# Patient Record
Sex: FEMALE | Race: WHITE | NOT HISPANIC OR LATINO | Employment: OTHER | URBAN - METROPOLITAN AREA
[De-identification: names, ages, dates, MRNs, and addresses within clinical notes are randomized per-mention and may not be internally consistent; named-entity substitution may affect disease eponyms.]

---

## 2024-07-30 ENCOUNTER — APPOINTMENT (OUTPATIENT)
Dept: SURGERY | Facility: CLINIC | Age: 69
End: 2024-07-30
Payer: MEDICARE

## 2024-07-30 VITALS
TEMPERATURE: 98.1 F | RESPIRATION RATE: 17 BRPM | HEART RATE: 80 BPM | DIASTOLIC BLOOD PRESSURE: 72 MMHG | SYSTOLIC BLOOD PRESSURE: 119 MMHG | WEIGHT: 216 LBS | OXYGEN SATURATION: 98 %

## 2024-07-30 DIAGNOSIS — M62.08 DIASTASIS RECTI: Primary | ICD-10-CM

## 2024-07-30 PROCEDURE — 1157F ADVNC CARE PLAN IN RCRD: CPT | Performed by: SURGERY

## 2024-07-30 PROCEDURE — 1036F TOBACCO NON-USER: CPT | Performed by: SURGERY

## 2024-07-30 PROCEDURE — 1159F MED LIST DOCD IN RCRD: CPT | Performed by: SURGERY

## 2024-07-30 PROCEDURE — 99204 OFFICE O/P NEW MOD 45 MIN: CPT | Performed by: SURGERY

## 2024-07-30 RX ORDER — LEVOTHYROXINE SODIUM 125 UG/1
1 TABLET ORAL
COMMUNITY
Start: 2024-02-07

## 2024-07-30 RX ORDER — ONDANSETRON 8 MG/1
TABLET, ORALLY DISINTEGRATING ORAL
COMMUNITY
Start: 2024-04-29

## 2024-07-30 NOTE — PROGRESS NOTES
History Of Present Illness :  Sheela Crabtree is a 68 y.o. female who presents essentially as a self-referral for an opinion regarding a diastases recti.  The patient has noted a progressive thinning or bulging of her upper abdominal wall for many years or decades.  She has chronic back pain, and does attribute this to a degree to a weakened abdominal wall.  She has never had a previous abdominal operation.  She denies any gastrointestinal symptoms.    Patient has a history of hypothyroidism secondary to Hashimoto's thyroiditis and follows with an endocrinologist at [facility].    The patient's primary physician is Dr. Nic Quinn.      The patient is  and lives in [place].  She is semiretired.  She works as a neuromuscular therapist.    Past Medical History   No past medical history on file.     Surgical History    No past surgical history on file.     Allergies   Not on File     Home Meds  No current outpatient medications     Family History    No family history on file.     Social History        Review Of Systems    Review of Systems    General: Not Present- Obesity, Cancer, HIV, MRSA, Recent Cold/Flu, Tired During the Day and VRE.  HEENT: Not Present- Migraine, Cataracts, Glaucoma, Macular Degeneration and Retinal Detachment.  Respiratory: Not Present- Asthma, Chronic Cough, Difficulty Breathing on Exertion, Difficulty Breathing at Rest, Emphysema, Frequent Bronchitis, Home CPAP/BiPAP, Home Oxygen, Pulmonary Embolus, Pneumonia/TB, Sleep Apnea and Snoring.  Cardiovascular: Not Present- Chest Pain, Congestive Heart Failure, Heart Attack, Coronary Artery Disease, Heart Stent, High Cholesterol/Lipids, Hypertension, Internal Defibrillator, Irregular Heart Beat, Mitral Valve Prolapse, Murmur, Pacemaker and Peripheral Vascular Disease.  Gastrointestinal: Not Present- Heartburn, Hepatitis, Hiatal Hernia, Jaundice, Reflux, Stomach Ulcer and IBS.  Female Genitourinary: Not Present- Kidney Failure, Kidney Stones,  Dialysis and Urinary Tract Infection.  Musculoskeletal: Not Present- Arthritis, Back Pain and Fibromyalgia.  Neurological: Not Present- Headaches, Numbness, Tingling, Seizures, Stroke,  Shunt and Weakness.  Psychiatric: Not Present- Anxiety, Bipolar, Depression and Panic Attacks.  Endocrine: Not Present- Diabetes, Hyperthyroidism and Low Blood Sugar.  Hematology: Not Present- Abnormal Bleeding, Anemia and Blood Clots.    Vitals  There were no vitals taken for this visit.     Physical Exam   Abdominal / Ano-Rectal Physical Exam    General  General Appearance - Not in acute distress.  Well-developed and well-nourished.  Alert and oriented times 3.    Chest and Lung Exam  Auscultation:  Breath sounds: - Normal.  Clear and equal bilaterally.  Adventitious sounds: - No Adventitious sounds.    Cardiovascular  Auscultation: Rate and Rhythm - Regular. Heart Sounds - Normal heart sounds.  No murmurs.    Abdomen  Inspection: Inspection of the abdomen reveals - No Visible peristalsis, No Abnormal pulsations, No Paradoxical  movements and No Hernias.  Palpation/Percussion: Palpation and Percussion of the abdomen reveal - Non Tender, No Rebound tenderness, NoRigidity (guarding) and No Palpable abdominal masses.  Liver: - Normal.  Spleen: - Normal.  Auscultation: Auscultation of the abdomen reveals - Bowel sounds normal and No Abdominal bruits.  Surgical scars:  none  The patient was examined supine and standing, with and without Valsalva  Her abdominal wall is intact; there are no fascial defects or bulges consistent with hernias    When moving from a supine to sitting position, the patient does have a large diastases recti along the upper midline measuring 10 cm transverse by 15 cm sagittal by about 5 cm in depth.      Inguinal  No inguinal or femoral hernias or lymphadenopathy bilaterally.    Rectal  Anorectal Exam:  not examined.      Assessment/Plan   Mrs. Crabtree has a large diastases recti.    Recommendations:    Repair  of RAD (rectus abdominis diastasis) typically falls in the realm of plastic surgery, as it is not a true hernia (i.e. fascial defect).  Typically, conservative management with weight loss and exercise is recommended as first-line treatment.      Just as with true hernias, there are multiple approaches to repair - open vs. laparoscopic, suture plication vs. mesh, anterior (on-lay) mesh vs. posterior mesh (retro-rectus) - there are proponents of all these methods.  The approach needs to be individualized to the particular patient, and of course is based on the surgeon´s comfort level.  Often, if there are multiple methods to approach a particular operation, that means no one method is superior.  In females, the repair is often combined with abdominoplasty.   Recurrence rates are variable, but can be quite high in some series - as high as 40%.  Repair of RAD may be considered cosmetic by insurance companies as it is not a true hernia.  There are no long-term pathologic consequences of not repairing RAD.      I explained to the patient that I do not operate for diastases recti, as my abdominal wall practice is limited to surgical care of hernias.    The patient does desire a second opinion from a surgeon who may have experience with repair of RAD.  We will try to arrange this for her.    Addendum 7/31/2024 [id number]    I reached out to one of my plastic surgery colleagues, Dr. Kam Muniz, who has experience with diastases recti, and we will be happy to see the patient in consultation.  My medical assistant, Ginger, will help arrange.  The patient will follow-up with me as needed.

## 2024-07-31 ENCOUNTER — TELEPHONE (OUTPATIENT)
Dept: SURGERY | Facility: CLINIC | Age: 69
End: 2024-07-31
Payer: MEDICARE

## 2024-09-09 ENCOUNTER — OFFICE VISIT (OUTPATIENT)
Dept: PLASTIC SURGERY | Facility: CLINIC | Age: 69
End: 2024-09-09
Payer: MEDICARE

## 2024-09-09 VITALS — BODY MASS INDEX: 35.99 KG/M2 | WEIGHT: 216 LBS | HEIGHT: 65 IN

## 2024-09-09 DIAGNOSIS — M62.08 DIASTASIS OF RECTUS ABDOMINIS: Primary | ICD-10-CM

## 2024-09-09 PROCEDURE — 3008F BODY MASS INDEX DOCD: CPT | Performed by: PHYSICIAN ASSISTANT

## 2024-09-09 PROCEDURE — 99213 OFFICE O/P EST LOW 20 MIN: CPT | Performed by: PHYSICIAN ASSISTANT

## 2024-09-09 PROCEDURE — 99203 OFFICE O/P NEW LOW 30 MIN: CPT | Performed by: PHYSICIAN ASSISTANT

## 2024-09-09 PROCEDURE — 1159F MED LIST DOCD IN RCRD: CPT | Performed by: PHYSICIAN ASSISTANT

## 2024-09-09 PROCEDURE — 1157F ADVNC CARE PLAN IN RCRD: CPT | Performed by: PHYSICIAN ASSISTANT

## 2024-09-09 NOTE — LETTER
2024     No Recipients    Patient: Sheela Crabtree   YOB: 1955   Date of Visit: 2024       Dear Dr. Lechuga Recipients:    Thank you for referring Sheela Crabtree to me for evaluation. Below are my notes for this consultation.  If you have questions, please do not hesitate to call me. I look forward to following your patient along with you.       Sincerely,     Ingris Ricks PA-C      CC: No Recipients  ______________________________________________________________________________________      Department of Plastic and Reconstructive Surgery      Initial Office Consultation    Date: 24  Referring Provider: Dr. Bennett  Primary Care Provider: No primary care provider on file.    Subjective  Sheela Crabtree is a 68 y.o. female who was referred by Dr. Bennett for evaluation of diastasis rectus.      She presents today to discuss abdominal bulging from a known diastasis. She endorses that with her first pregnancy in  she endorse she suffered severe diastasis rectus that caused bruising of her abdomen. She endorses she still has discoloration from the bruising. She endorses that she had 2 other pregnancies after this with .   She endorses in the  she was approved for repair of the diastasis rectus muscles however she did not pursue this due to loss of job/insurance at that time. She presents today due to worsening of abdominal bulging and pain. She endorses daily pain in the abdomen from weakness of the muscles. She states any time she flexes her abdomen to sit or stand the bulging in growing in size and causing her to experience abdominal and lower back pain. She has not had CT of the abdomen. She has attempted physical therapy years ago. She feels the bulge is growing in size.     PMH: ROBBY on CPAP, hypothyroidism secondary to Hashimoto's,   Surgical Hx:  x2, b/l breast implant removal   Family Hx: non-contributory   Smoking: non-smoker      Review of Systems   All  other systems have been reviewed with the patient and have been found to be negative with exception to the chief complaint as mentioned in the history of present illness.    ROS: As noted in history of present illness    - CONSTITUTIONAL: Denies weight loss, fever and chills.  - HEENT: Denies changes in vision and hearing.  - RESPIRATORY: Denies SOB and cough, difficulty breathing  - CV: Denies palpitations and CP  - GI: Denies nausea, vomiting and diarrhea. Positive for abdominal pain  - : Denies dysuria and urinary frequency.  - MSK: Denies myalgia and joint pain. Positive for low back pain  - SKIN: Denies rash and pruritus.  - NEUROLOGICAL: Denies headache and syncope.      Objective  Vital Signs: There were no vitals filed for this visit.    Gen: interactive and pleasant  Head: NCAT  Eyes: EOMI, PERRLA  Mouth: MMM  Throat: trachea midline  Cor: RRR  Pulm: nonlabored breathing  Abd: s/nt/nd  Neuro: AAOx3  Ext: extremities perfused    Focused exam of the: abdomen    Abdomen is soft, non-tender. There is excess skin of the lower abdomen. There is visible bulge of the abdomen. Bulge measures approx 20cm. When sitting and flexing the trunk bulge increases. Bulging is noted between the umbilicus and xiphoid.      Imaging  No imaging noted to review        Assessment/Plan    Sheela Crabtree is a 68 y.o. female who was referred by Dr. Bennett for evaluation of diastasis rectus.    She presents today with complaints of abdominal bulging from diastasis rectus. She endorses increasing lower back and abdominal pain that is worsening. She is experiencing pain daily. She attempted physical therapy years ago. I advised we will refer her to physical therapy for worsening abdominal weakness and lower back pain. Additionally I advised we will perform CT scan to further evaluate the extend of her diastasis.  She has not had imaging of the abdomen at this point. We will have her follow up after completion of CT scan and physical  therapy.       Plan:   Follow up with Dr Muniz after completion of physical therapy and CT abdomen  Referral to physical therapy   CT abdomen    I spent 35 minutes with this patient. Greater than 50% of this time was spent in the counselling and/or coordination of care of this patient.  This note was created using voice recognition software and was not corrected for typographical or grammatical errors.    Signature: Ingris Ricks PA-C  Date: 09/09/24

## 2024-09-09 NOTE — PROGRESS NOTES
Department of Plastic and Reconstructive Surgery      Initial Office Consultation    Date: 24  Referring Provider: Dr. Bennett  Primary Care Provider: No primary care provider on file.    Syed Crabtree is a 68 y.o. female who was referred by Dr. Bennett for evaluation of diastasis rectus.      She presents today to discuss abdominal bulging from a known diastasis. She endorses that with her first pregnancy in  she endorse she suffered severe diastasis rectus that caused bruising of her abdomen. She endorses she still has discoloration from the bruising. She endorses that she had 2 other pregnancies after this with .   She endorses in the  she was approved for repair of the diastasis rectus muscles however she did not pursue this due to loss of job/insurance at that time. She presents today due to worsening of abdominal bulging and pain. She endorses daily pain in the abdomen from weakness of the muscles. She states any time she flexes her abdomen to sit or stand the bulging in growing in size and causing her to experience abdominal and lower back pain. She has not had CT of the abdomen. She has attempted physical therapy years ago. She feels the bulge is growing in size.     PMH: ROBBY on CPAP, hypothyroidism secondary to Hashimoto's,   Surgical Hx:  x2, b/l breast implant removal   Family Hx: non-contributory   Smoking: non-smoker      Review of Systems   All other systems have been reviewed with the patient and have been found to be negative with exception to the chief complaint as mentioned in the history of present illness.    ROS: As noted in history of present illness    - CONSTITUTIONAL: Denies weight loss, fever and chills.  - HEENT: Denies changes in vision and hearing.  - RESPIRATORY: Denies SOB and cough, difficulty breathing  - CV: Denies palpitations and CP  - GI: Denies nausea, vomiting and diarrhea. Positive for abdominal pain  - : Denies dysuria and  urinary frequency.  - MSK: Denies myalgia and joint pain. Positive for low back pain  - SKIN: Denies rash and pruritus.  - NEUROLOGICAL: Denies headache and syncope.      Objective   Vital Signs: There were no vitals filed for this visit.    Gen: interactive and pleasant  Head: NCAT  Eyes: EOMI, PERRLA  Mouth: MMM  Throat: trachea midline  Cor: RRR  Pulm: nonlabored breathing  Abd: s/nt/nd  Neuro: AAOx3  Ext: extremities perfused    Focused exam of the: abdomen    Abdomen is soft, non-tender. There is excess skin of the lower abdomen. There is visible bulge of the abdomen. Bulge measures approx 20cm. When sitting and flexing the trunk bulge increases. Bulging is noted between the umbilicus and xiphoid.      Imaging  No imaging noted to review        Assessment/Plan     Sheela Crabtree is a 68 y.o. female who was referred by Dr. Bennett for evaluation of diastasis rectus.    She presents today with complaints of abdominal bulging from diastasis rectus. She endorses increasing lower back and abdominal pain that is worsening. She is experiencing pain daily. She attempted physical therapy years ago. I advised we will refer her to physical therapy for worsening abdominal weakness and lower back pain. Additionally I advised we will perform CT scan to further evaluate the extend of her diastasis.  She has not had imaging of the abdomen at this point. We will have her follow up after completion of CT scan and physical therapy.       Plan:   Follow up with Dr Muniz after completion of physical therapy and CT abdomen  Referral to physical therapy   CT abdomen    I spent 35 minutes with this patient. Greater than 50% of this time was spent in the counselling and/or coordination of care of this patient.  This note was created using voice recognition software and was not corrected for typographical or grammatical errors.    Signature: Ingris Ricks PA-C  Date: 09/09/24

## 2024-09-09 NOTE — LETTER
2024     Riley Bennett MD  [address]  [place] [address]    Patient: Sheela Crabtree   YOB: 1955   Date of Visit: 2024       Dear Dr. Riley Bennett MD:    Thank you for referring Sheela Crabtree to me for evaluation. Below are my notes for this consultation.  If you have questions, please do not hesitate to call me. I look forward to following your patient along with you.       Sincerely,     Ingris Ricks PA-C      CC: No Recipients  ______________________________________________________________________________________      Department of Plastic and Reconstructive Surgery      Initial Office Consultation    Date: 24  Referring Provider: Dr. Bennett  Primary Care Provider: No primary care provider on file.    Subjective  Sheela Crabtree is a 68 y.o. female who was referred by Dr. Bennett for evaluation of diastasis rectus.      She presents today to discuss abdominal bulging from a known diastasis. She endorses that with her first pregnancy in  she endorse she suffered severe diastasis rectus that caused bruising of her abdomen. She endorses she still has discoloration from the bruising. She endorses that she had 2 other pregnancies after this with .   She endorses in the  she was approved for repair of the diastasis rectus muscles however she did not pursue this due to loss of job/insurance at that time. She presents today due to worsening of abdominal bulging and pain. She endorses daily pain in the abdomen from weakness of the muscles. She states any time she flexes her abdomen to sit or stand the bulging in growing in size and causing her to experience abdominal and lower back pain. She has not had CT of the abdomen. She has attempted physical therapy years ago. She feels the bulge is growing in size.     PMH: ROBBY on CPAP, hypothyroidism secondary to Hashimoto's,   Surgical Hx:  x2, b/l breast implant removal   Family Hx: non-contributory    Smoking: non-smoker      Review of Systems   All other systems have been reviewed with the patient and have been found to be negative with exception to the chief complaint as mentioned in the history of present illness.    ROS: As noted in history of present illness    - CONSTITUTIONAL: Denies weight loss, fever and chills.  - HEENT: Denies changes in vision and hearing.  - RESPIRATORY: Denies SOB and cough, difficulty breathing  - CV: Denies palpitations and CP  - GI: Denies nausea, vomiting and diarrhea. Positive for abdominal pain  - : Denies dysuria and urinary frequency.  - MSK: Denies myalgia and joint pain. Positive for low back pain  - SKIN: Denies rash and pruritus.  - NEUROLOGICAL: Denies headache and syncope.      Objective  Vital Signs: There were no vitals filed for this visit.    Gen: interactive and pleasant  Head: NCAT  Eyes: EOMI, PERRLA  Mouth: MMM  Throat: trachea midline  Cor: RRR  Pulm: nonlabored breathing  Abd: s/nt/nd  Neuro: AAOx3  Ext: extremities perfused    Focused exam of the: abdomen    Abdomen is soft, non-tender. There is excess skin of the lower abdomen. There is visible bulge of the abdomen. Bulge measures approx 20cm. When sitting and flexing the trunk bulge increases. Bulging is noted between the umbilicus and xiphoid.      Imaging  No imaging noted to review        Assessment/Plan    Sheela Crabtree is a 68 y.o. female who was referred by Dr. Bennett for evaluation of diastasis rectus.    She presents today with complaints of abdominal bulging from diastasis rectus. She endorses increasing lower back and abdominal pain that is worsening. She is experiencing pain daily. She attempted physical therapy years ago. I advised we will refer her to physical therapy for worsening abdominal weakness and lower back pain. Additionally I advised we will perform CT scan to further evaluate the extend of her diastasis.  She has not had imaging of the abdomen at this point. We will have her follow  up after completion of CT scan and physical therapy.       Plan:   Follow up with Dr Muniz after completion of physical therapy and CT abdomen  Referral to physical therapy   CT abdomen    I spent 35 minutes with this patient. Greater than 50% of this time was spent in the counselling and/or coordination of care of this patient.  This note was created using voice recognition software and was not corrected for typographical or grammatical errors.    Signature: Ingris Ricks PA-C  Date: 09/09/24

## 2024-09-26 ENCOUNTER — HOSPITAL ENCOUNTER (OUTPATIENT)
Dept: RADIOLOGY | Facility: CLINIC | Age: 69
Discharge: HOME | End: 2024-09-26
Payer: MEDICARE

## 2024-09-26 DIAGNOSIS — M62.08 DIASTASIS OF RECTUS ABDOMINIS: ICD-10-CM

## 2024-09-26 PROCEDURE — 74176 CT ABD & PELVIS W/O CONTRAST: CPT

## 2024-09-26 PROCEDURE — 2550000001 HC RX 255 CONTRASTS: Performed by: PHYSICIAN ASSISTANT

## 2024-09-26 RX ORDER — DIATRIZOATE MEGLUMINE AND DIATRIZOATE SODIUM 660; 100 MG/ML; MG/ML
30 SOLUTION ORAL; RECTAL ONCE
Status: COMPLETED | OUTPATIENT
Start: 2024-09-26 | End: 2024-09-26

## 2024-10-08 ENCOUNTER — EVALUATION (OUTPATIENT)
Dept: PHYSICAL THERAPY | Facility: CLINIC | Age: 69
End: 2024-10-08
Payer: MEDICARE

## 2024-10-08 DIAGNOSIS — M62.08 DIASTASIS OF RECTUS ABDOMINIS: Primary | ICD-10-CM

## 2024-10-08 PROCEDURE — 97110 THERAPEUTIC EXERCISES: CPT | Mod: GP

## 2024-10-08 PROCEDURE — 97161 PT EVAL LOW COMPLEX 20 MIN: CPT | Mod: GP

## 2024-10-08 ASSESSMENT — ENCOUNTER SYMPTOMS
OCCASIONAL FEELINGS OF UNSTEADINESS: 1
DEPRESSION: 0
LOSS OF SENSATION IN FEET: 0

## 2024-10-08 NOTE — PROGRESS NOTES
Physical Therapy    PELVIC FLOOR EVALUATION AND TREATMENT    Patient Name: Sheela Crabtree  MRN: [id number]  Today's Date: 10/8/2024  Time Calculation  Start Time: 1437  Stop Time: 1530  Time Calculation (min): 53 min  PT Evaluation Time Entry  PT Evaluation (Low) Time Entry: 38  PT Therapeutic Procedures Time Entry  Therapeutic Exercise Time Entry: 15        Insurance: Medicare 80/20 after ded no auth req. Follows Medicare. [place]  Primary diagnosis: Diastasis of rectus abdominis  Visit # 1    Assessment:    Sheela Crabtree is a 68 y.o. referred for diastasis of rectus abdominis who presents with mid to lower abdominal soreness/pain and abdominal bulge with daily functional mobility. Observed valsalva maneuver with transitional movements and therapeutic exercises upon examination and treatment. Instructed in pressure management and initiated breathing sequencing to assist with management of symptoms. Patient would benefit from PT interventions to address the stated impairments, improve pain/pressure management/ease with functional mobility, establish HEP, and assist with chronic symptoms.    Plan   Treatment/Interventions: Education/ Instruction, Neuromuscular re-education, Therapeutic activities, Therapeutic exercises  PT Plan: Skilled PT  PT Frequency: 1 time per week  Duration: 4-6 visits  Certification Period Start Date: 10/08/2024  Certification Period End Date: 01/06/2024  Rehab Potential: Fair, chronic symptoms  Plan of Care Agreement: Patient    Current Problem  1. Diastasis of rectus abdominis  Referral to Physical Therapy    Follow Up In Physical Therapy        Problem List Items Addressed This Visit    None  Visit Diagnoses         Codes    Diastasis of rectus abdominis    -  Primary M62.08    Relevant Orders    Follow Up In Physical Therapy            General:  General  Reason for Referral: Diagnosis  M62.08 (ICD-10-CM) - Diastasis of rectus abdominis  Referred By: Ingris Alvarez,  PA-C  Precautions:  Precautions  STEADI Fall Risk Score (The score of 4 or more indicates an increased risk of falling): 3    Objective   PELVIC HISTORY:  Chief Complaint/Description of Symptoms: 9 inch diastasis of rectus abdominis, inguinal hernia on L wall. The internal wall bulges out, inguinal hernia gets achy. Worsened this past year.   HPI: 1 vaginal birth with tearing  first birth, rectus abdominis ripped apart, retrovaginal tear, rectal tear through sphincter muscle (residual numbness since); 2 c-sections ,   No help with PT in the past for diastasis, pelvic floor PT in 2019 with trigger points/tightness noted - uses pelvic wand (small change)  Moving too quickly get a pain in the  scar region.  Worsening overall. Surgery in the past cancelled due to Covid.  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 Discussed concerns on intake forms. Patient has followed up with medical providers to address.   Referred for PT: 2024    PELVIC PAIN:     Description: sore  Location: mid to lower abdomen  Frequency: intermittent  Pain at worst: 5/10 Exacerbating Factors: sitting up, worse with usage  Pain at best: 0/10  Alleviating Factors: not sitting forward, not using it    Since onset, the symptoms are: worsening  Pain when emptying bladder: no  Pain with wiping or tight clothing: no  Pain with intercourse: not currently sexually active  History of back pain: back pain for years, more constant now    EXERCISE:  Do you do Kegels? no   Current exercise regime: walking 3x/week, 20 minutes    BLADDER:     Excessive Urinary Urgency: no  Daytime Voiding Frequency: 6-7 voids/day  Nighttime Voiding Frequency: 1-2 voids/night  Unintentional urine loss  frequency: occasionally  Leakage occurs with: sneeze, just happens post-menopausal    Leakage amount: dribble; pads daily  Difficulty initiating urine flow/push to urinate: no  Slow/weak urine stream: a little  Able to completely empty bladder: yes  Frequent UTI's: no  Fluid Intake/Caffeine: 30-80oz/day, 0 caffeine/day    BOWEL:     Excessive Bowel Urgency: not frequently   BM Frequency: 6 times/day  Frequent Diarrhea: no  Frequent constipation/straining/incomplete emptying: rarely  Taking stool softeners or fiber supplements: no  Ancram Stool Scale rating: 3-4   Unintentional stool loss: decreased rectal control; sometimes will have stool loss   Stool loss frequency: 1-2 times/month; dons pads    External Examination:    POSTURE: mild forward head, rounded shoulders  Diastasis Recti: palpable 4 finger breaths at umbilicus at rest; increased abdominal bulge with head lift       MMT R/L:  Hip flexion: 4-/5 B P! L hernia region  Hip abduction: 4/5 B  Hip adduction: 4+/5 B  TA: poor    ROM R/L:  Lumbar flex: fingertips to malleoli  Lumbar ext: Mod limitation, increased abdominal bulge   Lumbar side bend: fingertips to mid femur B, sore L trunk  Hip flex: 90/90    Bridge endurance: 5 seconds; increased valsalva maneuver and increased bulge    OUTCOMES MEASURE:  Female NIH - Chronic Prostatitis Symptom Index (NIH-CPSI): Pain: 3; Urinary Symptoms: 2; QOL Impact: 4; total: 9    Treatment:  Therapeutic Exercise: Instructed in pressure management, as patient with tendency to hold breath, as well as breathing sequencing with log roll and sit to stand transfers, functional transfers with pelvic mobility. Education on modification or discontinuation of any exercise if adverse reaction arises.  Onset of vertigo with report of room spinning during supine to sit transfer with observed eye movement correlated, resolved with change in position on side. Plan to elevate bed surface in future and also recommend vestibular PT/follow up  with MD for symptoms (history of vertigo in the past)    Access Code: [email or web address]  URL: [email or web address]  Date: 10/08/2024  Prepared by: Suzette    Exercises  - Supine Posterior Pelvic Tilt  - 2 sets - 10 reps  - Seated Pelvic Tilt  - 2 sets - 10 reps    Patient Education  - Log Roll    OP Education: verbal, demonstration, HEP handout      Careplan Goals:    Problem: PT Problem       Goal: Patient will complete functional transitional movements, therapeutic exercises with Good breathing sequencing          Goal: Patient will sit up, complete functional transfers without pain on average, pain no greater than 3/10 at worst          Goal: [place] with self care for pressure management to prevent the reoccurrence of symptoms for home maintenance           Goal: Patient will demonstrate independence and compliance with HEP and self management           Goal: Patient will report a 6 point improvement on NIH-CPSI to demonstrate a reduction in symptoms and increase quality of life           Goal: Patient will improve strength by >=1/3 MMT to increase ease with daily functional mobility

## 2024-10-09 ENCOUNTER — APPOINTMENT (OUTPATIENT)
Dept: PHYSICAL THERAPY | Facility: CLINIC | Age: 69
End: 2024-10-09
Payer: MEDICARE

## 2024-11-05 ENCOUNTER — TREATMENT (OUTPATIENT)
Dept: PHYSICAL THERAPY | Facility: CLINIC | Age: 69
End: 2024-11-05
Payer: MEDICARE

## 2024-11-05 DIAGNOSIS — M62.08 DIASTASIS OF RECTUS ABDOMINIS: ICD-10-CM

## 2024-11-05 PROCEDURE — 97110 THERAPEUTIC EXERCISES: CPT | Mod: GP

## 2024-11-05 NOTE — PROGRESS NOTES
Physical Therapy    Physical Therapy Treatment    Patient Name: Sheela Crabtree  MRN: [id number]  Today's Date: 11/5/2024  Time Calculation  Start Time: 1600  Stop Time: 1642  Time Calculation (min): 42 min     PT Therapeutic Procedures Time Entry  Therapeutic Exercise Time Entry: 42        Insurance: Medicare 80/20 after ded no auth req. Follows Medicare. [place]  Primary diagnosis: Diastasis of rectus abdominis  Visit # 2      Assessment:  Patient returns for first follow up visit. Focused on intentionally breathing through transitional movements and therapeutic exercises to avoid breath holding/valsalva in order to assist with pressure management. Plan to ensure carry over next visit. Discontinued posterior pelvic tilt due to increased low back pain reported between sessions. Progressed with core engagement/TA without adverse reaction. Plan to continue as patient tolerates.    Plan:  Continue per initial PT POC.  Breathing sequencing with functional transfer sit to stand next    Current Problem  1. Diastasis of rectus abdominis  Follow Up In Physical Therapy        Problem List Items Addressed This Visit    None  Visit Diagnoses         Codes    Diastasis of rectus abdominis     M62.08            General   **referred for diastasis of rectus abdominis who presents with mid to lower abdominal soreness/pain and abdominal bulge with daily functional mobility    Precautions:   **Elevate head of bed due to history of vertigo**    Subjective    Recovered from vertigo flare up.   Noticed less swelling the abdomen after doing liver cleanse.  Always wore a pad, but stopped wearing it and noticed leakage - worse with sneeze, cough, laugh.  Back pain worse with posterior pelvic tilt - discontinued   No issues with constipation    Pain  Nagging pain 5/10 mid to lower abdomen   Worse with twisting too fast    Objective   FLEXIBILITY:  Adductor: Mod limitation    Treatment:  **Elevate head of bed due to history of  vertigo**  Therapeutic Exercise: Reviewed pressure management, as patient with tendency to hold breath, as well as breathing sequencing with log roll and sit to stand transfers, functional transfers with pelvic mobility. Reviewed modification or discontinuation of any exercise if adverse reaction arises.    Log roll with cues for breathing   Seated diaphragmatic breathing   Supine diaphragmatic breathing  Hookying on elevated mat, TA with cues for breathing sequencing  Hooklying on elevated mat, TA with hip adduction  Hooklying on elevated mat, TA with hip clamshell red TB  Supine adductor stretch  Back pain worse with posterior pelvic tilt - discontinued    Instructed in knack technique with cough/sneeze/laugh     OP Education: verbal, demonstration, HEP handout + knack handout    Access Code: [email or web address]  URL: [email or web address]  Date: 11/05/2024  Prepared by: Suzette    Exercises  - Supine Diaphragmatic Breathing  - 2-3 sets - 10 reps  - Supine Transversus Abdominis Bracing - Hands on Ground  - 2 sets - 10 reps  - Seated Diaphragmatic Breathing  - 2-3 sets - 10 reps  - Supine Hip Adduction Isometric with Ball  - 2 sets - 10 reps  - Hooklying Clamshell with Resistance  - 2 sets - 10 reps    Access Code: [email or web address]  URL: [email or web address]  Date: 10/08/2024  Prepared by: Suzette     Exercises  - Supine Posterior Pelvic Tilt  - 2 sets - 10 reps  - Seated Pelvic Tilt  - 2 sets - 10 reps     Patient Education  - Log Roll       Goals:  Active       PT Problem       Patient will complete functional transitional movements, therapeutic exercises with Good breathing sequencing       Start:  10/08/24    Expected End:  01/06/25            Patient will sit up, complete functional transfers without pain on average, pain no greater than 3/10 at worst       Start:  10/08/24    Expected End:  01/06/25            [place] with self care for pressure management to prevent the  reoccurrence of symptoms for home maintenance        Start:  10/08/24    Expected End:  01/06/25            Patient will demonstrate independence and compliance with HEP and self management        Start:  10/08/24    Expected End:  01/06/25            Patient will report a 6 point improvement on NIH-CPSI to demonstrate a reduction in symptoms and increase quality of life        Start:  10/08/24    Expected End:  01/06/25            Patient will improve strength by >=1/3 MMT to increase ease with daily functional mobility       Start:  10/08/24    Expected End:  01/06/25

## 2024-11-12 ENCOUNTER — TREATMENT (OUTPATIENT)
Dept: PHYSICAL THERAPY | Facility: CLINIC | Age: 69
End: 2024-11-12
Payer: MEDICARE

## 2024-11-12 DIAGNOSIS — M62.08 DIASTASIS OF RECTUS ABDOMINIS: Primary | ICD-10-CM

## 2024-11-12 PROCEDURE — 97110 THERAPEUTIC EXERCISES: CPT | Mod: GP

## 2024-11-12 NOTE — PROGRESS NOTES
Physical Therapy    Physical Therapy Treatment    Patient Name: Sheela Crabtree  MRN: [id number]  Today's Date: 11/12/2024  Time Calculation  Start Time: 1602  Stop Time: 1642  Time Calculation (min): 40 min     PT Therapeutic Procedures Time Entry  Therapeutic Exercise Time Entry: 42        Insurance: Medicare 80/20 after ded no auth req. Follows Medicare. [place]  Primary diagnosis: Diastasis of rectus abdominis  Visit # 3      Assessment:  Reviewed HEP to ensure proper set-up and technique. Verbal cues and tactile cues to improve core engagement with less intense drawing in upon review of HEP and ensuring breathing to avoid breath holding/valsalva. Continued focus on intentionally breathing through transitional movements with bed mobility and sit<>stand transfers, as well as throughout therapeutic exercises to to assist with pressure management. Plan to continue as patient tolerates. She is aware to discontinue any exercise if adverse reaction arises.     Plan:  Continue per initial PT POC.  Ensure Good carry over of breathing sequencing with functional transfer sit to stand     Current Problem  1. Diastasis of rectus abdominis            Problem List Items Addressed This Visit    None  Visit Diagnoses         Codes    Diastasis of rectus abdominis    -  Primary M62.08            General   **referred for diastasis of rectus abdominis who presents with mid to lower abdominal soreness/pain and abdominal bulge with daily functional mobility    Precautions:   **Elevate head of bed due to history of vertigo**    Subjective    Lower inguinal hernia is now becoming a constant pain.   Felt a big clunk at the sacrum rolling over in bed. One position was easier waking up the next morning, something I always felt was tight was gone.  Using knack technique    Pain  8/10 lower L abdomen, inguinal hernia region    Objective   FLEXIBILITY:  Adductor: Mod limitation    Treatment:  **Elevate head of bed due to history of  vertigo**  Therapeutic Exercise: Reviewed importance of pressure management, as patient with tendency to hold breath, as well as breathing sequencing with log roll and sit to stand transfers, functional transfers with pelvic mobility. Reviewed modification or discontinuation of any exercise if adverse reaction arises.    Log roll with cues for breathing   Seated diaphragmatic breathing   Supine diaphragmatic breathing  Hookying on elevated mat, TA with cues for breathing sequencing  Hooklying on elevated mat, TA with hip adduction  Hooklying on elevated mat, TA with hip clamshell red TB  Supine adductor stretch  Clamshell in sidelying  Functional sit to stand with breathing sequencing to assist with pressure management    Back pain worse with posterior pelvic tilt - discontinued 11/05/2024    Reviewed knack technique with cough/sneeze/laugh     OP Education: verbal, demonstration, HEP handout    Access Code: [email or web address]  URL: [email or web address]  Date: 11/12/2024  Prepared by: Suzette    Exercises  - Clamshell  - 2 sets - 10 reps  - Sit to Stand with Core Engagement  - 2 sets - 5 reps    Access Code: [email or web address]  URL: [email or web address]  Date: 11/05/2024  Prepared by: Suzette    Exercises  - Supine Diaphragmatic Breathing  - 2-3 sets - 10 reps  - Supine Transversus Abdominis Bracing - Hands on Ground  - 2 sets - 10 reps  - Seated Diaphragmatic Breathing  - 2-3 sets - 10 reps  - Supine Hip Adduction Isometric with Ball  - 2 sets - 10 reps  - Hooklying Clamshell with Resistance  - 2 sets - 10 reps    Access Code: [email or web address]  URL: [email or web address]  Date: 10/08/2024  Prepared by: Suzette     Exercises  - Supine Posterior Pelvic Tilt  - 2 sets - 10 reps  - Seated Pelvic Tilt  - 2 sets - 10 reps     Patient Education  - Log Roll       Goals:  Active       PT Problem       Patient will complete functional transitional movements, therapeutic exercises  with Good breathing sequencing       Start:  10/08/24    Expected End:  01/06/25            Patient will sit up, complete functional transfers without pain on average, pain no greater than 3/10 at worst       Start:  10/08/24    Expected End:  01/06/25            [place] with self care for pressure management to prevent the reoccurrence of symptoms for home maintenance        Start:  10/08/24    Expected End:  01/06/25            Patient will demonstrate independence and compliance with HEP and self management        Start:  10/08/24    Expected End:  01/06/25            Patient will report a 6 point improvement on NIH-CPSI to demonstrate a reduction in symptoms and increase quality of life        Start:  10/08/24    Expected End:  01/06/25            Patient will improve strength by >=1/3 MMT to increase ease with daily functional mobility       Start:  10/08/24    Expected End:  01/06/25

## 2024-11-19 ENCOUNTER — TREATMENT (OUTPATIENT)
Dept: PHYSICAL THERAPY | Facility: CLINIC | Age: 69
End: 2024-11-19
Payer: MEDICARE

## 2024-11-19 DIAGNOSIS — M62.08 DIASTASIS OF RECTUS ABDOMINIS: ICD-10-CM

## 2024-11-19 PROCEDURE — 97110 THERAPEUTIC EXERCISES: CPT | Mod: GP

## 2024-11-19 NOTE — PROGRESS NOTES
Physical Therapy    Physical Therapy Treatment    Patient Name: Sheela Crabtree  MRN: [id number]  Today's Date: 11/19/2024  Time Calculation  Start Time: 1605  Stop Time: 1645  Time Calculation (min): 40 min     PT Therapeutic Procedures Time Entry  Therapeutic Exercise Time Entry: 40        Insurance: Medicare 80/20 after ded no auth req. Follows Medicare. [place]  Primary diagnosis: Diastasis of rectus abdominis  Visit # 4      Assessment:  Improved breathing sequencing with log roll technique observed today, as well as therapeutic exercises. Slight bulging noted with head lift that improved with cues; although discontinued after two repetitions due to patient concerns for onset of vertigo flare up with head lift positioning. Progressed with repetitions without adverse reaction.    Plan:  Continue per initial PT POC.  Ensure continued carry over of breathing sequencing with functional transitional movements    Current Problem  1. Diastasis of rectus abdominis  Follow Up In Physical Therapy            Problem List Items Addressed This Visit    None  Visit Diagnoses         Codes    Diastasis of rectus abdominis     M62.08              General   **referred for diastasis of rectus abdominis who presents with mid to lower abdominal soreness/pain and abdominal bulge with daily functional mobility    Precautions:   **Elevate head of bed due to history of vertigo**    Subjective    I don't feel like the hernia's changed, but my core is feeling more stable and my back is feeling stronger.  I stopped pulling it in so far, so that stabbing pain stopped.  If I twist and turn wrong it will shoot to 10/10.  Using knack technique which has been helpful (cough/sneeze/laugh)    Pain  2-3/10 lower L abdomen, inguinal hernia region    Objective     Treatment:  **Elevate head of bed due to history of vertigo**  Therapeutic Exercise: Reviewed importance of pressure management, as patient with tendency to hold breath, as well  as breathing sequencing with log roll and sit to stand transfers, functional transfers with pelvic mobility. Reviewed modification or discontinuation of any exercise if adverse reaction arises.    Seated diaphragmatic breathing   Supine diaphragmatic breathing  Hookying on elevated mat, TA with cues for breathing sequencing  Hooklying on elevated mat, TA with hip adduction  Hooklying on elevated mat, TA with hip clamshell green TB  Supine adductor stretch  Clamshell in sidelying with added resistance band - reviewed  Functional sit to stand with breathing sequencing to assist with pressure management, red TB around knees    Back pain worse with posterior pelvic tilt - discontinued 11/05/2024       OP Education: verbal, demonstration, HEP handout  Abdominal support with work duties     Access Code: [email or web address]  URL: [email or web address]  Date: 11/12/2024  Prepared by: Suzette    Exercises  - Clamshell  - 2 sets - 10 reps  - Sit to Stand with Core Engagement  - 2 sets - 5 reps    Access Code: [email or web address]  URL: [email or web address]  Date: 11/05/2024  Prepared by: Suzette    Exercises  - Supine Diaphragmatic Breathing  - 2-3 sets - 10 reps  - Supine Transversus Abdominis Bracing - Hands on Ground  - 2 sets - 10 reps  - Seated Diaphragmatic Breathing  - 2-3 sets - 10 reps  - Supine Hip Adduction Isometric with Ball  - 2 sets - 10 reps  - Hooklying Clamshell with Resistance  - 2 sets - 10 reps    Access Code: [email or web address]  URL: [email or web address]  Date: 10/08/2024  Prepared by: Suzette     Exercises  - Supine Posterior Pelvic Tilt  - 2 sets - 10 reps  - Seated Pelvic Tilt  - 2 sets - 10 reps     Patient Education  - Log Roll       Goals:  Active       PT Problem       Patient will complete functional transitional movements, therapeutic exercises with Good breathing sequencing       Start:  10/08/24    Expected End:  01/06/25            Patient will sit up,  complete functional transfers without pain on average, pain no greater than 3/10 at worst       Start:  10/08/24    Expected End:  01/06/25            [place] with self care for pressure management to prevent the reoccurrence of symptoms for home maintenance        Start:  10/08/24    Expected End:  01/06/25            Patient will demonstrate independence and compliance with HEP and self management        Start:  10/08/24    Expected End:  01/06/25            Patient will report a 6 point improvement on NIH-CPSI to demonstrate a reduction in symptoms and increase quality of life        Start:  10/08/24    Expected End:  01/06/25            Patient will improve strength by >=1/3 MMT to increase ease with daily functional mobility       Start:  10/08/24    Expected End:  01/06/25

## 2024-11-26 ENCOUNTER — TREATMENT (OUTPATIENT)
Dept: PHYSICAL THERAPY | Facility: CLINIC | Age: 69
End: 2024-11-26
Payer: MEDICARE

## 2024-11-26 DIAGNOSIS — M62.08 DIASTASIS OF RECTUS ABDOMINIS: Primary | ICD-10-CM

## 2024-11-26 PROCEDURE — 97110 THERAPEUTIC EXERCISES: CPT | Mod: GP

## 2024-11-26 NOTE — PROGRESS NOTES
Physical Therapy    Physical Therapy Treatment    Patient Name: Sheela Crabtree  MRN: [id number]  Today's Date: 11/26/2024  Time Calculation  Start Time: 1605  Stop Time: 1645  Time Calculation (min): 40 min     PT Therapeutic Procedures Time Entry  Therapeutic Exercise Time Entry: 40        Insurance: Medicare 80/20 after ded no auth req. Follows Medicare. [place]its based on [place]  Primary diagnosis: Diastasis of rectus abdominis  Visit # 5      Assessment:  Progressed with functional transitional movements, hooklying to sitting today. Added in bend/lift body mechanics with waste basket to simulate daily functional mobility, with focus on breathing sequencing and pressure management. Good return demonstration. No change in pain.    Plan:  Continue per initial PT POC.  Ensure continued carry over of breathing sequencing with functional transitional movements, progression of therapeutic exercises    Current Problem  1. Diastasis of rectus abdominis  Follow Up In Physical Therapy            Problem List Items Addressed This Visit             ICD-10-CM    Diastasis of rectus abdominis - Primary M62.08         General   **referred for diastasis of rectus abdominis who presents with mid to lower abdominal soreness/pain and abdominal bulge with daily functional mobility    Precautions:   **Elevate head of bed due to history of vertigo**    Subjective    Getting stronger, feel more pulled in a little less sore. Noticing back less sore.    Pain  2-3/10 lower L abdomen, inguinal hernia region ice pick sensation    Objective     Treatment:  **Elevate head of bed due to history of vertigo**  Therapeutic Exercise:   Supine diaphragmatic breathing   Glute bridge  Hookying on elevated mat, TA with cues for breathing sequencing  Hooklying on elevated mat, TA with hip adduction  Hooklying on elevated mat, TA with hip clamshell green TB  Clamshell in sidelying with added resistance band - reviewed  Functional sit to stand with breathing  sequencing to assist with pressure management  Functional bending/lifting with breathing sequencing, body mechanics floor to waist level     Back pain worse with posterior pelvic tilt - discontinued 11/05/2024       OP Education: verbal, demonstration, incorporation of breathing sequencing, pressure management with daily functional movements (I.e. laundry, garbage, groceries)       Goals:  Active       PT Problem       Patient will complete functional transitional movements, therapeutic exercises with Good breathing sequencing       Start:  10/08/24    Expected End:  01/06/25            Patient will sit up, complete functional transfers without pain on average, pain no greater than 3/10 at worst       Start:  10/08/24    Expected End:  01/06/25            [place] with self care for pressure management to prevent the reoccurrence of symptoms for home maintenance        Start:  10/08/24    Expected End:  01/06/25            Patient will demonstrate independence and compliance with HEP and self management        Start:  10/08/24    Expected End:  01/06/25            Patient will report a 6 point improvement on NIH-CPSI to demonstrate a reduction in symptoms and increase quality of life        Start:  10/08/24    Expected End:  01/06/25            Patient will improve strength by >=1/3 MMT to increase ease with daily functional mobility       Start:  10/08/24    Expected End:  01/06/25

## 2024-12-05 ENCOUNTER — APPOINTMENT (OUTPATIENT)
Dept: PHYSICAL THERAPY | Facility: CLINIC | Age: 69
End: 2024-12-05
Payer: MEDICARE

## 2024-12-17 ENCOUNTER — TREATMENT (OUTPATIENT)
Dept: PHYSICAL THERAPY | Facility: CLINIC | Age: 69
End: 2024-12-17
Payer: MEDICARE

## 2024-12-17 DIAGNOSIS — M62.08 DIASTASIS OF RECTUS ABDOMINIS: Primary | ICD-10-CM

## 2024-12-17 PROCEDURE — 97110 THERAPEUTIC EXERCISES: CPT | Mod: GP

## 2024-12-17 NOTE — PROGRESS NOTES
Physical Therapy    Physical Therapy Treatment    Patient Name: Sheela Crabtree  MRN: [id number]  Today's Date: 12/17/2024  Time Calculation  Start Time: 1516  Stop Time: 1558  Time Calculation (min): 42 min     PT Therapeutic Procedures Time Entry  Therapeutic Exercise Time Entry: 42        Insurance: Medicare 80/20 after ded no auth req. Follows Medicare. [place]its based on [place]  Primary diagnosis: Diastasis of rectus abdominis  Visit # 6      Assessment:  Progressed with strengthening and functional transitional movements. Good technique, breathing sequencing and pressure management. No change in pain. Goal review next.    Plan:  Plan for one additional visit to ensure Good set-up and technique to progress to Indep HEP.  Goal review next.    Current Problem  1. Diastasis of rectus abdominis            Problem List Items Addressed This Visit             ICD-10-CM    Diastasis of rectus abdominis - Primary M62.08       General   **referred for diastasis of rectus abdominis who presents with mid to lower abdominal soreness/pain and abdominal bulge with daily functional mobility    Precautions:   **Elevate head of bed due to history of vertigo**    Subjective    Stomach sickness last week. HEP not as often.  More mindful of breathing sequencing, pressure management with daily functional movements (I.e. laundry, garbage, groceries)    Pain  2-3/10 lower L abdomen, inguinal hernia region sore  10/10 if move/turn the wrong way    Objective     Treatment:  **Elevate head of bed due to history of vertigo**  Therapeutic Exercise:   Supine diaphragmatic breathing   Hookying on elevated mat, TA with cues for breathing sequencing reviewed  Hooklying on elevated mat, TA with hip adduction to sitting TA with hip adduction  Hooklying on elevated mat, TA with hip clamshell blue TB  Clamshell in sidelying with blue TB  Glute bridge with clamshell, blue TB  Functional sit to stand with breathing sequencing to assist with pressure  management, added 2kg weighted ball press out at abdomen  TA with diagonal chop 2kg weighted ball ea  Functional bending/lifting with breathing sequencing, body mechanics floor to waist level reviewed    Back pain worse with posterior pelvic tilt - discontinued 11/05/2024    OP Education: verbal, demonstration    Access Code: [email or web address]  URL: [email or web address]  Date: 12/17/2024  Prepared by: Suzette    Exercises  - Sit to Stand  - 10 reps (with core engagement + 3-4# press out from abdomen)       Goals:  Active       PT Problem       Patient will complete functional transitional movements, therapeutic exercises with Good breathing sequencing       Start:  10/08/24    Expected End:  01/06/25            Patient will sit up, complete functional transfers without pain on average, pain no greater than 3/10 at worst       Start:  10/08/24    Expected End:  01/06/25            [place] with self care for pressure management to prevent the reoccurrence of symptoms for home maintenance        Start:  10/08/24    Expected End:  01/06/25            Patient will demonstrate independence and compliance with HEP and self management        Start:  10/08/24    Expected End:  01/06/25            Patient will report a 6 point improvement on NIH-CPSI to demonstrate a reduction in symptoms and increase quality of life        Start:  10/08/24    Expected End:  01/06/25            Patient will improve strength by >=1/3 MMT to increase ease with daily functional mobility       Start:  10/08/24    Expected End:  01/06/25

## 2024-12-29 ENCOUNTER — APPOINTMENT (OUTPATIENT)
Dept: URGENT CARE | Age: 69
End: 2024-12-29
Payer: MEDICARE

## 2025-03-12 ENCOUNTER — DOCUMENTATION (OUTPATIENT)
Dept: PHYSICAL THERAPY | Facility: CLINIC | Age: 70
End: 2025-03-12
Payer: MEDICARE

## 2025-03-12 NOTE — PROGRESS NOTES
Physical Therapy    Discharge Summary    Name: Sheela Crabtree  MRN: [id number]  : 1955  Date: 23    Discharge Summary: PT    Discharge Information: Date of discharge 3/12/2025, Date of last visit 2024, Date of evaluation 10/08/2024, Number of attended visits 6, Referred by Ingris Alvarez PA-C, and Referred for Diastasis of rectus abdominis     Discharge Status: Patient completed 6 visits. See last daily note for last known status.     Rehab Discharge Reason: Patient completed 6 therapy visits. No further visits scheduled/attended.